# Patient Record
(demographics unavailable — no encounter records)

---

## 2024-11-20 NOTE — ASSESSMENT
[FreeTextEntry1] : //  h/o anxiety/depression, better after establishing care with psychiatrist, therapist  -continue fluoxetine as directed -f/u as per psych, therapist   Former smoker, about 22-pack-year history and quit 4 years ago. lung CT-normal in 2022, 2023  Obese, has been having difficulty losing weight for many years. met with bariatric surg, still thinking about it. Currently maxed out on Ozempic with no major change in weight despite healthy diet and exercise -increase zepbound to 15mg weekly as discussed, risks and benefits of medication discussed in detail no known personal or fmhx of thyroid disorder or MEN -Being overweight increases your risk of health conditions such as heart disease, high blood pressure, type 2 diabetes, and certain types of cancer. It can also increase your risk for osteoarthritis, sleep apnea, and other respiratory problems. Aim for a slow, steady weight loss. Even a small amount of weight loss can lower your risk of health problems.  History of ulcerative colitis, last colonoscopy in 2022.  s/p acute flare.  normal colonoscopy in 4/2024 -continue mesalamine -f/u as per GI  Mild persistent asthma, unable tolerate Breo.  symbicort-not covered  -continue montelukast and reevaluate if there's any wheezing  h/o Elevated TSH, normal t4 will like to stop some of her medications. off levothyroxine since everal years ago   Healthcare Maintenance -Advise Yearly Skin cancer screening with Dermatologist  -Advise Yearly Eye exam with Ophthalmologist -Advise Yearly Dental exam -Educated of the importance of Healthy diet, such as Mediterranean Diet and Exercise, such as walking >20 minutes a day and increasing gradually as tolerated  Immunizations -Flu vaccine -given today  -Covid vaccine  -Discussed shingles vaccine (shingrix), advised to verify with insurance if its covered through medical or prescription plan  Preventative screening  -advised to get PAP for cervical cancer screening -up to date  -advised to get mammogram for breast cancer screening -up to date  -advised to get bone density for osteoporosis screening -needs to complete  -advised to get colonoscopy for colon cancer screening -up to date   f/u in 3 months for weight check

## 2024-11-20 NOTE — HEALTH RISK ASSESSMENT
[Good] : ~his/her~  mood as  good [No] : In the past 12 months have you used drugs other than those required for medical reasons? No [No falls in past year] : Patient reported no falls in the past year [0] : 2) Feeling down, depressed, or hopeless: Not at all (0) [PHQ-2 Negative - No further assessment needed] : PHQ-2 Negative - No further assessment needed [Fully functional (bathing, dressing, toileting, transferring, walking, feeding)] : Fully functional (bathing, dressing, toileting, transferring, walking, feeding) [Fully functional (using the telephone, shopping, preparing meals, housekeeping, doing laundry, using] : Fully functional and needs no help or supervision to perform IADLs (using the telephone, shopping, preparing meals, housekeeping, doing laundry, using transportation, managing medications and managing finances) [[id number]] : 0 [Change in mental status noted] : No change in mental status noted [] :  [Reports changes in hearing] : Reports no changes in hearing [Reports changes in vision] : Reports no changes in vision [MammogramDate] : 2022 [ColonoscopyDate] : 2024

## 2024-11-20 NOTE — PHYSICAL EXAM
[Normal] : normal rate, regular rhythm, normal S1 and S2 and no murmur heard [No Varicosities] : no varicosities [Pedal Pulses Present] : the pedal pulses are present [No Edema] : there was no peripheral edema [No Extremity Clubbing/Cyanosis] : no extremity clubbing/cyanosis [Soft] : abdomen soft [Non Tender] : non-tender [Non-distended] : non-distended [No Focal Deficits] : no focal deficits [Normal Affect] : the affect was normal [Normal Mood] : the mood was normal

## 2024-11-20 NOTE — HISTORY OF PRESENT ILLNESS
[de-identified] : 61 year old female with h/o ulcerative colitis, hypertension, obesity, Pre-DM presents for annual exam.    doing well on zepbound.  notable weight loss since last visit.  happy about her progress  Currently on mesalamine for ulcerative colitis. seen by GI, stable   Is still debating whether to get a right knee replacement as pain has subsided.  Will reevaluate in the summer.    Former smoker, quit in 2017  Employed- for [employer]

## 2024-12-03 NOTE — DISCUSSION/SUMMARY
[Low acute suicide risk] : Low acute suicide risk [No] : No [Not clinically indicated] : Safety Plan completed/updated (for individuals at risk): Not clinically indicated [FreeTextEntry1] : 3/19/24- Increase fluoxetine from 20 mg to 40 mg daily. Substitute alprazolam with lorazepam. Refer to therapy. Follow-up in one month.  4/9/24- Continue fluoxetine 40 mg daily and lorazepam PRN, encouraged to call therapy referrals. F/u in 3-4 weeks. 4/23/24- Improved in setting of resolution of stressors (daughter's hospitalization). Continue fluoxetine at current dose. Follow-up in a month.  6/6/24- No changes. Continue fluoxetine and lorazepam.  9/3/24- No changes. Continue fluoxetine and lorazepam PRN. 12/3/24-  No changes. Continue fluoxetine and lorazepam PRN.

## 2024-12-03 NOTE — HISTORY OF PRESENT ILLNESS
[FreeTextEntry1] : Refer to intake documentation from 3/19/24 for details.  [FreeTextEntry2] : Refer to intake documentation from 3/19/24 for details.  [FreeTextEntry3] : Current psych meds: --fluoxetine  --lorazepam PRN  Past psych meds: -alprazolam

## 2024-12-03 NOTE — PHYSICAL EXAM
[Average] : average [Cooperative] : cooperative [Euthymic] : euthymic [Full] : full [Clear] : clear [Linear/Goal Directed] : linear/goal directed [None] : none [None Reported] : none reported [WNL] : within normal limits [de-identified] : bright [FreeTextEntry7] : Devoid of suicidal, homicidal content.

## 2024-12-03 NOTE — PLAN
[FreeTextEntry5] : 1. Unspecified Depressive Disorder --Continue fluoxetine 40 mg daily. Renewed today with 2 refills --Continue lorazepam 0.5 mg daily PRN severe anxiety.  --Encourage use of non-pharmacological coping methods for anxiety  --Have referred to therapy and continue to reinforce the importance of this as part of the treatment plan --Follow-up in three months or sooner PRN   -[facility] reviewed prior to all controlled substance prescriptions. Ref #[id number]. - I have given my usual talk about the risks and benefits of all treatment recommendations including alternatives and the risks and benefits of no treatment at all. Patient had the opportunity to have all questions answered to their satisfaction. They expressed understanding and agreement with the above treatment plan. - Educated patient of importance of remaining abstinent from drugs and alcohol. - Discussed that unpredictable effects including cardiorespiratory collapse can result from the combination of illicit drugs and prescribed medications. Patient verbalized understanding. - Emergency procedures were discussed: pt. educated to call 911 or go to nearest ER for worsening of symptoms/suicidal/homicidal ideation. - Patient given opportunity to ask questions and their questions were answered and they expressed understanding and agreement with above plan.   I spent a total of 20 minutes for today's visit in evaluating and treating the patient as per above, including preparing for patient's appointment (review of prior documents, [facility] ), performing a medically necessary exam/evaluation, communicating/counseling/educating the patient ordering medications, documenting clinical information in the EHR.

## 2025-02-26 NOTE — HISTORY OF PRESENT ILLNESS
[de-identified] : 61 year old female with h/o ulcerative colitis, hypertension, obesity, Pre-DM presents for followup  doing well on zepbound.  notable weight loss since last visit.  happy about her progress  Currently on mesalamine for ulcerative colitis. seen by GI, stable   Is still debating whether to get a right knee replacement as pain has subsided.  Will reevaluate in the summer.    Former smoker, quit in 2017  Employed- for [employer]

## 2025-02-26 NOTE — ASSESSMENT
[FreeTextEntry1] : //  h/o anxiety/depression, better after establishing care with psychiatrist, therapist  -continue fluoxetine as directed -f/u as per psych, therapist   Former smoker, about 22-pack-year history and quit 4 years ago. lung CT-normal in 2022, 2023, 2024  Obese, has been having difficulty losing weight for many years. met with bariatric surg, still thinking about it. Currently maxed out on Ozempic with no major change in weight despite healthy diet and exercise -continue zepbound to 15mg weekly as discussed, risks and benefits of medication discussed in detail no known personal or fmhx of thyroid disorder or MEN -Being overweight increases your risk of health conditions such as heart disease, high blood pressure, type 2 diabetes, and certain types of cancer. It can also increase your risk for osteoarthritis, sleep apnea, and other respiratory problems. Aim for a slow, steady weight loss. Even a small amount of weight loss can lower your risk of health problems.  History of ulcerative colitis, last colonoscopy in 2022.  s/p acute flare.  normal colonoscopy in 4/2024 -continue mesalamine -f/u as per GI  Mild persistent asthma, unable tolerate Breo.  symbicort-not covered  -continue montelukast   h/o Elevated TSH, normal t4 will like to stop some of her medications. off levothyroxine since several years ago  -monitor levels   Preventative screening  -advised to get PAP for cervical cancer screening -up to date  -advised to get mammogram for breast cancer screening -up to date  -advised to get bone density for osteoporosis screening -needs to complete  -advised to get colonoscopy for colon cancer screening -up to date   f/u in 3 months for weight check

## 2025-03-04 NOTE — DISCUSSION/SUMMARY
[Low acute suicide risk] : Low acute suicide risk [No] : No [Not clinically indicated] : Safety Plan completed/updated (for individuals at risk): Not clinically indicated [FreeTextEntry1] : 3/19/24- Increase fluoxetine from 20 mg to 40 mg daily. Substitute alprazolam with lorazepam. Refer to therapy. Follow-up in one month.  4/9/24- Continue fluoxetine 40 mg daily and lorazepam PRN, encouraged to call therapy referrals. F/u in 3-4 weeks. 4/23/24- Improved in setting of resolution of stressors (daughter's hospitalization). Continue fluoxetine at current dose. Follow-up in a month.  6/6/24- No changes. Continue fluoxetine and lorazepam.  9/3/24- No changes. Continue fluoxetine and lorazepam PRN. 12/3/24- No changes. Continue fluoxetine and lorazepam PRN. 3/4/25- No changes. Continue fluoxetine and lorazepam PRN.

## 2025-03-04 NOTE — PHYSICAL EXAM
[Average] : average [Cooperative] : cooperative [Euthymic] : euthymic [Full] : full [Clear] : clear [Linear/Goal Directed] : linear/goal directed [None] : none [None Reported] : none reported [WNL] : within normal limits [de-identified] : bright [FreeTextEntry7] : Devoid of suicidal, homicidal content.

## 2025-03-04 NOTE — PLAN
[FreeTextEntry5] : 1. Unspecified Depressive Disorder --Continue fluoxetine 40 mg daily. Renewed today with 2 refills --Continue lorazepam 0.5 mg daily PRN severe anxiety.  --Encourage use of non-pharmacological coping methods for anxiety  --Have referred to therapy and continue to reinforce the importance of this as part of the treatment plan --Follow-up in three months or sooner PRN   -[place]  reviewed prior to all controlled substance prescriptions. Ref #[id number]. - I have given my usual talk about the risks and benefits of all treatment recommendations including alternatives and the risks and benefits of no treatment at all. Patient had the opportunity to have all questions answered to their satisfaction. They expressed understanding and agreement with the above treatment plan. - Educated patient of importance of remaining abstinent from drugs and alcohol. - Discussed that unpredictable effects including cardiorespiratory collapse can result from the combination of illicit drugs and prescribed medications. Patient verbalized understanding. - Emergency procedures were discussed: pt. educated to call 911 or go to nearest ER for worsening of symptoms/suicidal/homicidal ideation. - Patient given opportunity to ask questions and their questions were answered and they expressed understanding and agreement with above plan.

## 2025-03-04 NOTE — HISTORY OF PRESENT ILLNESS
[FreeTextEntry1] : Refer to intake documentation from 3/19/24 for details.  [FreeTextEntry2] : Refer to intake documentation from 3/19/24 for details.  [FreeTextEntry3] : Current psych meds: --fluoxetine  --lorazepam PRN (last filled 12/3/24 for 30 tabs)  Past psych meds: -alprazolam

## 2025-04-16 NOTE — HISTORY OF PRESENT ILLNESS
[de-identified] : 61 year old female with h/o ulcerative colitis, hypertension, obesity, Pre-DM presents for followup but also c/o   Shooting pain down her left leg.  Worse after sitting for long period of time and trying to get up.  Denies any swelling, redness.  Has been struggling with ongoing left knee pain.  Does follow with orthopedic and oral and colon.  Unable to get appointment until the end of May.  doing well on zepbound.  notable weight loss since last visit.  happy about her progress  Currently on mesalamine for ulcerative colitis. seen by GI, stable   Former smoker, quit in 2017  Employed- for [employer]

## 2025-04-16 NOTE — HISTORY OF PRESENT ILLNESS
[de-identified] : 61 year old female with h/o ulcerative colitis, hypertension, obesity, Pre-DM presents for followup but also c/o   Shooting pain down her left leg.  Worse after sitting for long period of time and trying to get up.  Denies any swelling, redness.  Has been struggling with ongoing left knee pain.  Does follow with orthopedic and oral and colon.  Unable to get appointment until the end of May.  doing well on zepbound.  notable weight loss since last visit.  happy about her progress  Currently on mesalamine for ulcerative colitis. seen by GI, stable   Former smoker, quit in 2017  Employed- for [employer]

## 2025-04-16 NOTE — PHYSICAL EXAM
[Normal] : normal rate, regular rhythm, normal S1 and S2 and no murmur heard [No Edema] : there was no peripheral edema [Soft] : abdomen soft [Non Tender] : non-tender [Non-distended] : non-distended [No Joint Swelling] : no joint swelling [Grossly Normal Strength/Tone] : grossly normal strength/tone [No Rash] : no rash [No Focal Deficits] : no focal deficits [Normal Affect] : the affect was normal [Normal Mood] : the mood was normal [de-identified] : limited ROM, mainly flexion due to pain

## 2025-04-16 NOTE — ASSESSMENT
[FreeTextEntry1] : //  labs drawn today   Left leg pain, likely related to underlying osteoarthritis -meloxicam as directed  -gabapentin as directed, risks and benefits of medication discussed in detail  -f/u with ortho -Advised if symptoms worsen please call office or go directly to ED for further evaluation.   h/o anxiety/depression, better after establishing care with psychiatrist, therapist  -continue fluoxetine as directed -f/u as per psych, therapist   Former smoker, about 22-pack-year history and quit 4 years ago. lung CT-normal in 2022, 2023, 2024  Obese, has been having difficulty losing weight for many years. met with bariatric surg, still thinking about it. Currently maxed out on Ozempic with no major change in weight despite healthy diet and exercise -continue zepbound to 15mg weekly as discussed, risks and benefits of medication discussed in detail no known personal or fmhx of thyroid disorder or MEN -Being overweight increases your risk of health conditions such as heart disease, high blood pressure, type 2 diabetes, and certain types of cancer. It can also increase your risk for osteoarthritis, sleep apnea, and other respiratory problems. Aim for a slow, steady weight loss. Even a small amount of weight loss can lower your risk of health problems.  History of ulcerative colitis, last colonoscopy in 2022.  s/p acute flare.  normal colonoscopy in 4/2024 -continue mesalamine -f/u as per GI  Mild persistent asthma, unable tolerate Breo.  symbicort-not covered, wheezing resolved  -continue montelukast   h/o Elevated TSH, normal t4 will like to stop some of her medications. off levothyroxine since several years ago  -monitor levels   Preventative screening  -advised to get PAP for cervical cancer screening -up to date  -advised to get mammogram for breast cancer screening -up to date  -advised to get bone density for osteoporosis screening -needs to complete  -advised to get colonoscopy for colon cancer screening -up to date   f/u in 3 months for weight check

## 2025-04-16 NOTE — PHYSICAL EXAM
[Normal] : normal rate, regular rhythm, normal S1 and S2 and no murmur heard [No Edema] : there was no peripheral edema [Soft] : abdomen soft [Non Tender] : non-tender [Non-distended] : non-distended [No Joint Swelling] : no joint swelling [Grossly Normal Strength/Tone] : grossly normal strength/tone [No Rash] : no rash [No Focal Deficits] : no focal deficits [Normal Affect] : the affect was normal [Normal Mood] : the mood was normal [de-identified] : limited ROM, mainly flexion due to pain

## 2025-06-03 NOTE — PLAN
[FreeTextEntry5] : 1. Unspecified Depressive Disorder --Continue fluoxetine 40 mg daily. Renewed today with 2 refills --Continue lorazepam 0.5 mg daily PRN severe anxiety.  --Encourage use of non-pharmacological coping methods for anxiety  --Have referred to therapy and continue to reinforce the importance of this as part of the treatment plan --Follow-up in three months or sooner PRN   -[place]  reviewed prior to all controlled substance prescriptions. Ref #[id number]. - I have given my usual talk about the risks and benefits of all treatment recommendations including alternatives and the risks and benefits of no treatment at all. Patient had the opportunity to have all questions answered to their satisfaction. They expressed understanding and agreement with the above treatment plan. - Educated patient of importance of remaining abstinent from drugs and alcohol. - Discussed that unpredictable effects including cardiorespiratory collapse can result from the combination of illicit drugs and prescribed medications. Patient verbalized understanding. - Emergency procedures were discussed: pt. educated to call 911 or go to nearest ER for worsening of symptoms/suicidal/homicidal ideation. - Patient given opportunity to ask questions and their questions were answered and they expressed understanding and agreement with above plan.

## 2025-06-03 NOTE — PHYSICAL EXAM
[Average] : average [Cooperative] : cooperative [Euthymic] : euthymic [Full] : full [Clear] : clear [Linear/Goal Directed] : linear/goal directed [None] : none [None Reported] : none reported [WNL] : within normal limits [de-identified] : bright [FreeTextEntry7] : Devoid of suicidal, homicidal content.

## 2025-06-03 NOTE — DISCUSSION/SUMMARY
[Low acute suicide risk] : Low acute suicide risk [No] : No [Not clinically indicated] : Safety Plan completed/updated (for individuals at risk): Not clinically indicated [FreeTextEntry1] : 3/19/24- Increase fluoxetine from 20 mg to 40 mg daily. Substitute alprazolam with lorazepam. Refer to therapy. Follow-up in one month.  4/9/24- Continue fluoxetine 40 mg daily and lorazepam PRN, encouraged to call therapy referrals. F/u in 3-4 weeks. 4/23/24- Improved in setting of resolution of stressors (daughter's hospitalization). Continue fluoxetine at current dose. Follow-up in a month.  6/6/24- No changes. Continue fluoxetine and lorazepam.  9/3/24- No changes. Continue fluoxetine and lorazepam PRN. 12/3/24- No changes. Continue fluoxetine and lorazepam PRN. 3/4/25- No changes. Continue fluoxetine and lorazepam PRN.  6/3/25- No changes. Continue fluoxetine and lorazepam PRN.

## 2025-06-18 NOTE — HISTORY OF PRESENT ILLNESS
[de-identified] : 61 year old female with h/o ulcerative colitis, hypertension, obesity, Pre-DM presents c/o pain on her right neck.  daughter and  felt there is swelling in that region.  no other acute issues   doing well on zepbound.  notable weight loss since last visit.  happy about her progress  Currently on mesalamine for ulcerative colitis. seen by GI, stable   Former smoker, quit in 2017  Employed- for [employer]

## 2025-06-18 NOTE — ASSESSMENT
[FreeTextEntry1] : //  Neck pain, appears related to MSK, no mass appreciated  -Muscle relaxers help decrease muscle spasms and back pain, -NSAIDs, such as ibuprofen, Tylenol help decrease swelling, pain, -if persists will get imaging  -Apply ice to affected area for 15 to 20 minutes every hour or as directed. Use an ice pack, or put crushed ice in a plastic bag. Cover it with a towel before you apply it to your skin. Ice helps prevent tissue damage and decreases pain. -Apply heat to affected area for 20 to 30 minutes every 2 hours for as many days as directed. Heat helps decrease pain and muscle spasms. -Stay active as much as you can without causing more pain. Bed rest could make your back pain worse. Avoid heavy lifting until your pain is gone.

## 2025-06-18 NOTE — PHYSICAL EXAM
[Normal] : normal rate, regular rhythm, normal S1 and S2 and no murmur heard [No Rash] : no rash [No Focal Deficits] : no focal deficits [Normal Affect] : the affect was normal [Normal Mood] : the mood was normal [de-identified] : no swelling noted on right neck, muscle tightness noted.  no definite mass appreciated

## 2025-06-18 NOTE — HISTORY OF PRESENT ILLNESS
[de-identified] : 61 year old female with h/o ulcerative colitis, hypertension, obesity, Pre-DM presents c/o pain on her right neck.  daughter and  felt there is swelling in that region.  no other acute issues   doing well on zepbound.  notable weight loss since last visit.  happy about her progress  Currently on mesalamine for ulcerative colitis. seen by GI, stable   Former smoker, quit in 2017  Employed- for [employer]

## 2025-06-18 NOTE — PHYSICAL EXAM
[Normal] : normal rate, regular rhythm, normal S1 and S2 and no murmur heard [No Rash] : no rash [No Focal Deficits] : no focal deficits [Normal Affect] : the affect was normal [Normal Mood] : the mood was normal [de-identified] : no swelling noted on right neck, muscle tightness noted.  no definite mass appreciated